# Patient Record
Sex: FEMALE | Race: WHITE | NOT HISPANIC OR LATINO | URBAN - METROPOLITAN AREA
[De-identification: names, ages, dates, MRNs, and addresses within clinical notes are randomized per-mention and may not be internally consistent; named-entity substitution may affect disease eponyms.]

---

## 2022-09-01 ENCOUNTER — APPOINTMENT (OUTPATIENT)
Dept: URBAN - METROPOLITAN AREA CLINIC 193 | Age: 47
Setting detail: DERMATOLOGY
End: 2022-09-05

## 2022-09-01 DIAGNOSIS — L40.0 PSORIASIS VULGARIS: ICD-10-CM

## 2022-09-01 DIAGNOSIS — L57.8 OTHER SKIN CHANGES DUE TO CHRONIC EXPOSURE TO NONIONIZING RADIATION: ICD-10-CM

## 2022-09-01 DIAGNOSIS — L72.3 SEBACEOUS CYST: ICD-10-CM

## 2022-09-01 DIAGNOSIS — L82.1 OTHER SEBORRHEIC KERATOSIS: ICD-10-CM

## 2022-09-01 DIAGNOSIS — D22 MELANOCYTIC NEVI: ICD-10-CM

## 2022-09-01 DIAGNOSIS — L81.1 CHLOASMA: ICD-10-CM

## 2022-09-01 DIAGNOSIS — L81.4 OTHER MELANIN HYPERPIGMENTATION: ICD-10-CM

## 2022-09-01 PROBLEM — D22.61 MELANOCYTIC NEVI OF RIGHT UPPER LIMB, INCLUDING SHOULDER: Status: ACTIVE | Noted: 2022-09-01

## 2022-09-01 PROBLEM — D22.72 MELANOCYTIC NEVI OF LEFT LOWER LIMB, INCLUDING HIP: Status: ACTIVE | Noted: 2022-09-01

## 2022-09-01 PROBLEM — D22.71 MELANOCYTIC NEVI OF RIGHT LOWER LIMB, INCLUDING HIP: Status: ACTIVE | Noted: 2022-09-01

## 2022-09-01 PROCEDURE — OTHER COUNSELING: OTHER

## 2022-09-01 PROCEDURE — OTHER PRESCRIPTION MEDICATION MANAGEMENT: OTHER

## 2022-09-01 PROCEDURE — 99204 OFFICE O/P NEW MOD 45 MIN: CPT

## 2022-09-01 ASSESSMENT — LOCATION SIMPLE DESCRIPTION DERM
LOCATION SIMPLE: LEFT FOREHEAD
LOCATION SIMPLE: LEFT ANTERIOR NECK
LOCATION SIMPLE: RIGHT UPPER ARM
LOCATION SIMPLE: LEFT CALF
LOCATION SIMPLE: RIGHT FOOT
LOCATION SIMPLE: LEFT PLANTAR SURFACE
LOCATION SIMPLE: RIGHT EYEBROW
LOCATION SIMPLE: LEFT HAND
LOCATION SIMPLE: SCALP
LOCATION SIMPLE: RIGHT HAND
LOCATION SIMPLE: LEFT PRETIBIAL REGION
LOCATION SIMPLE: CHEST
LOCATION SIMPLE: RIGHT CHEEK
LOCATION SIMPLE: POSTERIOR SCALP
LOCATION SIMPLE: LEFT CHEEK

## 2022-09-01 ASSESSMENT — LOCATION DETAILED DESCRIPTION DERM
LOCATION DETAILED: LEFT MEDIAL SUPERIOR CHEST
LOCATION DETAILED: RIGHT ANTERIOR PROXIMAL UPPER ARM
LOCATION DETAILED: RIGHT THENAR EMINENCE
LOCATION DETAILED: LEFT INFERIOR ANTERIOR NECK
LOCATION DETAILED: RIGHT LATERAL EYEBROW
LOCATION DETAILED: LEFT PLANTAR FOREFOOT OVERLYING 4TH METATARSAL
LOCATION DETAILED: POSTERIOR MID-PARIETAL SCALP
LOCATION DETAILED: LEFT DISTAL CALF
LOCATION DETAILED: RIGHT CENTRAL MALAR CHEEK
LOCATION DETAILED: RIGHT DORSAL FOOT
LOCATION DETAILED: LEFT THENAR EMINENCE
LOCATION DETAILED: LEFT CENTRAL MALAR CHEEK
LOCATION DETAILED: LEFT INFERIOR MEDIAL FOREHEAD
LOCATION DETAILED: LEFT PROXIMAL PRETIBIAL REGION
LOCATION DETAILED: RIGHT SUPERIOR PARIETAL SCALP

## 2022-09-01 ASSESSMENT — LOCATION ZONE DERM
LOCATION ZONE: FACE
LOCATION ZONE: NECK
LOCATION ZONE: ARM
LOCATION ZONE: TRUNK
LOCATION ZONE: SCALP
LOCATION ZONE: HAND
LOCATION ZONE: LEG
LOCATION ZONE: FEET

## 2022-09-01 NOTE — PROCEDURE: PRESCRIPTION MEDICATION MANAGEMENT
Detail Level: Zone
Initiate Treatment: Discussed topicals and systemic medications including Vtama and Skyrizi.  Pt declines tx at this time.
Render In Strict Bullet Format?: No

## 2022-09-01 NOTE — PROCEDURE: COUNSELING
Detail Level: Generalized
Patient Specific Counseling (Will Not Stick From Patient To Patient): Patient will schedule a shave removal of the SK above her right eye.
Detail Level: Detailed

## 2022-09-01 NOTE — HPI: EVALUATION OF SKIN LESION(S)
What Type Of Note Output Would You Prefer (Optional)?: Standard Output
Hpi Title: Evaluation of Skin Lesions
Additional History: Patient reports to have psoriasis. Not treating with anything right now. Patient  reports a dark spot she is concern about located above her right eye.

## 2022-09-12 ENCOUNTER — APPOINTMENT (OUTPATIENT)
Dept: URBAN - METROPOLITAN AREA CLINIC 193 | Age: 47
Setting detail: DERMATOLOGY
End: 2022-09-16

## 2022-09-12 DIAGNOSIS — D485 NEOPLASM OF UNCERTAIN BEHAVIOR OF SKIN: ICD-10-CM

## 2022-09-12 DIAGNOSIS — L40.0 PSORIASIS VULGARIS: ICD-10-CM

## 2022-09-12 PROBLEM — D48.5 NEOPLASM OF UNCERTAIN BEHAVIOR OF SKIN: Status: ACTIVE | Noted: 2022-09-12

## 2022-09-12 PROCEDURE — OTHER COUNSELING: OTHER

## 2022-09-12 PROCEDURE — 11102 TANGNTL BX SKIN SINGLE LES: CPT

## 2022-09-12 PROCEDURE — 99213 OFFICE O/P EST LOW 20 MIN: CPT | Mod: 25

## 2022-09-12 PROCEDURE — OTHER BIOPSY BY SHAVE METHOD: OTHER

## 2022-09-12 PROCEDURE — OTHER PRESCRIPTION MEDICATION MANAGEMENT: OTHER

## 2022-09-12 PROCEDURE — OTHER PRESCRIPTION: OTHER

## 2022-09-12 RX ORDER — CALCIPOTRIENE AND BETAMETHASONE DIPROPIONATE 50; 64 UG/G; MG/G
CREAM TOPICAL
Qty: 1 | Refills: 3 | Status: CANCELLED

## 2022-09-12 ASSESSMENT — LOCATION SIMPLE DESCRIPTION DERM
LOCATION SIMPLE: RIGHT ELBOW
LOCATION SIMPLE: RIGHT EYEBROW
LOCATION SIMPLE: LEFT ELBOW

## 2022-09-12 ASSESSMENT — LOCATION DETAILED DESCRIPTION DERM
LOCATION DETAILED: RIGHT LATERAL EYEBROW
LOCATION DETAILED: RIGHT ELBOW
LOCATION DETAILED: LEFT ELBOW

## 2022-09-12 ASSESSMENT — LOCATION ZONE DERM
LOCATION ZONE: ARM
LOCATION ZONE: FACE

## 2022-09-12 NOTE — PROCEDURE: PRESCRIPTION MEDICATION MANAGEMENT
Continue Regimen: Calcipitrione-Betamethasone. Apply to affected areas twice daily for up to two weeks per month.
Detail Level: Zone
Samples Given: Vtama. Apply to affected areas twice daily.
Render In Strict Bullet Format?: No
Plan: Patient states that she will try the Vtama. If she is not happy with the results, she will call in and request a refill of her former Calcipitione-Betamethasone. The Rx has been created in her chart, and will just require “sending” electronically, if patient decides she wants it.

## 2022-09-12 NOTE — HPI: PROCEDURE (SKIN BIOPSY)
How Severe Is Your Condition?: moderate
Additional History: This spot has not been biopsied previously.